# Patient Record
Sex: MALE | Race: WHITE | NOT HISPANIC OR LATINO | Employment: FULL TIME | URBAN - NONMETROPOLITAN AREA
[De-identification: names, ages, dates, MRNs, and addresses within clinical notes are randomized per-mention and may not be internally consistent; named-entity substitution may affect disease eponyms.]

---

## 2017-06-02 ENCOUNTER — OFFICE VISIT (OUTPATIENT)
Dept: INTERNAL MEDICINE | Facility: CLINIC | Age: 37
End: 2017-06-02

## 2017-06-02 VITALS
HEIGHT: 76 IN | OXYGEN SATURATION: 98 % | TEMPERATURE: 98.3 F | DIASTOLIC BLOOD PRESSURE: 72 MMHG | BODY MASS INDEX: 27.64 KG/M2 | SYSTOLIC BLOOD PRESSURE: 136 MMHG | WEIGHT: 227 LBS | HEART RATE: 97 BPM

## 2017-06-02 DIAGNOSIS — J45.21 MILD INTERMITTENT ASTHMA WITH ACUTE EXACERBATION: Primary | ICD-10-CM

## 2017-06-02 PROBLEM — J45.909 AIRWAY HYPERREACTIVITY: Status: ACTIVE | Noted: 2017-06-02

## 2017-06-02 PROCEDURE — 99213 OFFICE O/P EST LOW 20 MIN: CPT | Performed by: PHYSICIAN ASSISTANT

## 2017-06-02 RX ORDER — MONTELUKAST SODIUM 10 MG/1
10 TABLET ORAL NIGHTLY
Qty: 30 TABLET | Refills: 11 | Status: SHIPPED | OUTPATIENT
Start: 2017-06-02

## 2017-06-02 RX ORDER — DIPHENHYDRAMINE HCL 50 MG
50 CAPSULE ORAL EVERY 6 HOURS PRN
COMMUNITY

## 2017-06-02 RX ORDER — ALBUTEROL SULFATE 90 UG/1
2 AEROSOL, METERED RESPIRATORY (INHALATION) EVERY 4 HOURS PRN
Qty: 1 INHALER | Refills: 11 | Status: SHIPPED | OUTPATIENT
Start: 2017-06-02 | End: 2018-03-19 | Stop reason: SDUPTHER

## 2017-06-02 NOTE — PROGRESS NOTES
Vazquez Taveras is a 37 y.o. male.     Subjective   History of Present Illness    Here today requesting refill of albuterol inhaler for asthma. Has been using the albuterol inhaler daily for the last month to control intermittent dyspnea and wheezing which is typical for him this time of year. Other than during spring allergy season he rarely uses the inhaler. Is using Zyrtec daily and Benadryl nightly to help his allergies. Has never used Singulair for allergy and asthma control. Denies chest pain or tightness.       The following portions of the patient's history were reviewed and updated as appropriate: allergies, current medications, past family history, past medical history, past social history, past surgical history and problem list.    Review of Systems    Constitutional: Negative for appetite change, chills, fatigue, fever and unexpected weight change.   HENT: Negative for congestion, ear pain, hearing loss, nosebleeds, postnasal drip, rhinorrhea, sore throat, tinnitus and trouble swallowing.    Eyes: Negative for photophobia, discharge and visual disturbance.   Respiratory: dyspnea, wheezing. Negative for cough, chest tightness   Cardiovascular: Negative for chest pain, palpitations and leg swelling.   Gastrointestinal: Negative for abdominal distention, abdominal pain, blood in stool, constipation, diarrhea, nausea and vomiting.   Endocrine: Negative for cold intolerance, heat intolerance, polydipsia, polyphagia and polyuria.   Musculoskeletal: Negative for arthralgias, back pain, joint swelling, myalgias, neck pain and neck stiffness.   Skin: Negative for color change, pallor, rash and wound.   Allergic/Immunologic: Negative for environmental allergies, food allergies and immunocompromised state.   Neurological: Negative for dizziness, tremors, seizures, weakness, numbness and headaches.   Hematological: Negative for adenopathy. Does not bruise/bleed easily.   Psychiatric/Behavioral: Negative for sleep  "disturbances, agitation, behavioral problems, confusion, hallucinations, self-injury and suicidal ideas. The patient is not nervous/anxious.      Objective    Physical Exam  Constitutional: Oriented to person, place, and time. Appears well-developed and well-nourished.   HENT:   Head: Normocephalic and atraumatic.   Eyes: EOM are normal. Pupils are equal, round, and reactive to light.   Neck: Normal range of motion. Neck supple.   Cardiovascular: Normal rate, regular rhythm and normal heart sounds.    Pulmonary/Chest: occasional wheeze. Effort normal and breath sounds normal. No respiratory distress.  Has no rales. Exhibits no chest wall tenderness.   Abdominal: Soft. Bowel sounds are normal. Exhibits no distension and no mass. There is no tenderness.   Musculoskeletal: Normal range of motion. Exhibits no tenderness.   Neurological: Alert and oriented to person, place, and time.   Skin: Skin is warm and dry.   Psychiatric: Has a normal mood and affect. Behavior is normal. Judgment and thought content normal.       /72  Pulse 97  Temp 98.3 °F (36.8 °C)  Ht 76\" (193 cm)  Wt 227 lb (103 kg)  SpO2 98%  BMI 27.63 kg/m2    Nursing note and vitals reviewed.        Assessment/Plan   Vazquez was seen today for follow-up and asthma.    Diagnoses and all orders for this visit:    Mild intermittent asthma with acute exacerbation  -     albuterol (PROAIR HFA) 108 (90 BASE) MCG/ACT inhaler; Inhale 2 puffs Every 4 (Four) Hours As Needed for Wheezing.  -     montelukast (SINGULAIR) 10 MG tablet; Take 1 tablet by mouth Every Night.      Continue Zyrtec daily and Benadryl nightly prn.            "

## 2018-03-19 DIAGNOSIS — J45.21 MILD INTERMITTENT ASTHMA WITH ACUTE EXACERBATION: ICD-10-CM

## 2018-03-19 RX ORDER — ALBUTEROL SULFATE 90 UG/1
2 AEROSOL, METERED RESPIRATORY (INHALATION) EVERY 4 HOURS PRN
Qty: 3 INHALER | Refills: 1 | Status: SHIPPED | OUTPATIENT
Start: 2018-03-19

## 2025-03-07 ENCOUNTER — OFFICE VISIT (OUTPATIENT)
Age: 45
End: 2025-03-07
Payer: COMMERCIAL

## 2025-03-07 VITALS
TEMPERATURE: 98.6 F | DIASTOLIC BLOOD PRESSURE: 78 MMHG | HEART RATE: 75 BPM | OXYGEN SATURATION: 97 % | SYSTOLIC BLOOD PRESSURE: 128 MMHG | WEIGHT: 226 LBS | BODY MASS INDEX: 27.52 KG/M2 | HEIGHT: 76 IN

## 2025-03-07 DIAGNOSIS — Z00.00 ANNUAL PHYSICAL EXAM: Primary | ICD-10-CM

## 2025-03-07 DIAGNOSIS — J45.20 MILD INTERMITTENT ASTHMA WITHOUT COMPLICATION: ICD-10-CM

## 2025-03-07 DIAGNOSIS — Z23 NEED FOR TDAP VACCINATION: ICD-10-CM

## 2025-03-07 DIAGNOSIS — R41.840 ATTENTION OR CONCENTRATION DEFICIT: ICD-10-CM

## 2025-03-07 LAB
ALBUMIN SERPL-MCNC: 4.9 G/DL (ref 3.5–5.2)
ALBUMIN/GLOB SERPL: 1.9 G/DL
ALP SERPL-CCNC: 50 U/L (ref 39–117)
ALT SERPL W P-5'-P-CCNC: 17 U/L (ref 1–41)
ANION GAP SERPL CALCULATED.3IONS-SCNC: 11.2 MMOL/L (ref 5–15)
AST SERPL-CCNC: 25 U/L (ref 1–40)
BASOPHILS # BLD AUTO: 0.04 10*3/MM3 (ref 0–0.2)
BASOPHILS NFR BLD AUTO: 0.9 % (ref 0–1.5)
BILIRUB SERPL-MCNC: 0.6 MG/DL (ref 0–1.2)
BUN SERPL-MCNC: 8 MG/DL (ref 6–20)
BUN/CREAT SERPL: 9.4 (ref 7–25)
CALCIUM SPEC-SCNC: 10.3 MG/DL (ref 8.6–10.5)
CHLORIDE SERPL-SCNC: 100 MMOL/L (ref 98–107)
CHOLEST SERPL-MCNC: 207 MG/DL (ref 0–200)
CO2 SERPL-SCNC: 26.8 MMOL/L (ref 22–29)
CREAT SERPL-MCNC: 0.85 MG/DL (ref 0.76–1.27)
DEPRECATED RDW RBC AUTO: 42.2 FL (ref 37–54)
EGFRCR SERPLBLD CKD-EPI 2021: 109.9 ML/MIN/1.73
EOSINOPHIL # BLD AUTO: 0.17 10*3/MM3 (ref 0–0.4)
EOSINOPHIL NFR BLD AUTO: 3.8 % (ref 0.3–6.2)
ERYTHROCYTE [DISTWIDTH] IN BLOOD BY AUTOMATED COUNT: 12.1 % (ref 12.3–15.4)
GLOBULIN UR ELPH-MCNC: 2.6 GM/DL
GLUCOSE SERPL-MCNC: 89 MG/DL (ref 65–99)
HBA1C MFR BLD: 5.3 % (ref 4.8–5.6)
HCT VFR BLD AUTO: 45.3 % (ref 37.5–51)
HDLC SERPL-MCNC: 60 MG/DL (ref 40–60)
HGB BLD-MCNC: 14.9 G/DL (ref 13–17.7)
IMM GRANULOCYTES # BLD AUTO: 0.01 10*3/MM3 (ref 0–0.05)
IMM GRANULOCYTES NFR BLD AUTO: 0.2 % (ref 0–0.5)
LDLC SERPL CALC-MCNC: 130 MG/DL (ref 0–100)
LDLC/HDLC SERPL: 2.13 {RATIO}
LYMPHOCYTES # BLD AUTO: 1.36 10*3/MM3 (ref 0.7–3.1)
LYMPHOCYTES NFR BLD AUTO: 30.2 % (ref 19.6–45.3)
MCH RBC QN AUTO: 31 PG (ref 26.6–33)
MCHC RBC AUTO-ENTMCNC: 32.9 G/DL (ref 31.5–35.7)
MCV RBC AUTO: 94.4 FL (ref 79–97)
MONOCYTES # BLD AUTO: 0.31 10*3/MM3 (ref 0.1–0.9)
MONOCYTES NFR BLD AUTO: 6.9 % (ref 5–12)
NEUTROPHILS NFR BLD AUTO: 2.62 10*3/MM3 (ref 1.7–7)
NEUTROPHILS NFR BLD AUTO: 58 % (ref 42.7–76)
NRBC BLD AUTO-RTO: 0 /100 WBC (ref 0–0.2)
PLATELET # BLD AUTO: 255 10*3/MM3 (ref 140–450)
PMV BLD AUTO: 10.6 FL (ref 6–12)
POTASSIUM SERPL-SCNC: 4.4 MMOL/L (ref 3.5–5.2)
PROT SERPL-MCNC: 7.5 G/DL (ref 6–8.5)
RBC # BLD AUTO: 4.8 10*6/MM3 (ref 4.14–5.8)
SODIUM SERPL-SCNC: 138 MMOL/L (ref 136–145)
TRIGL SERPL-MCNC: 95 MG/DL (ref 0–150)
TSH SERPL DL<=0.05 MIU/L-ACNC: 1.91 UIU/ML (ref 0.27–4.2)
VLDLC SERPL-MCNC: 17 MG/DL (ref 5–40)
WBC NRBC COR # BLD AUTO: 4.51 10*3/MM3 (ref 3.4–10.8)

## 2025-03-07 PROCEDURE — 80050 GENERAL HEALTH PANEL: CPT | Performed by: STUDENT IN AN ORGANIZED HEALTH CARE EDUCATION/TRAINING PROGRAM

## 2025-03-07 PROCEDURE — 83036 HEMOGLOBIN GLYCOSYLATED A1C: CPT | Performed by: STUDENT IN AN ORGANIZED HEALTH CARE EDUCATION/TRAINING PROGRAM

## 2025-03-07 PROCEDURE — 80061 LIPID PANEL: CPT | Performed by: STUDENT IN AN ORGANIZED HEALTH CARE EDUCATION/TRAINING PROGRAM

## 2025-03-07 RX ORDER — ALBUTEROL SULFATE 90 UG/1
2 INHALANT RESPIRATORY (INHALATION) EVERY 4 HOURS PRN
Qty: 18 G | Refills: 3 | Status: SHIPPED | OUTPATIENT
Start: 2025-03-07

## 2025-03-07 NOTE — PATIENT INSTRUCTIONS

## 2025-03-07 NOTE — PROGRESS NOTES
New Patient Office Visit      Date: 2025  Patient Name: Vazquez Taveras  : 1980   MRN: [id number]     Chief Complaint:    Chief Complaint   Patient presents with    Establish Care     History of Present Illness  The patient is a 45-year-old male who presents to establish care.    He has not sought medical attention for the past 12 years and is currently seeking a routine checkup. He reports a significant weight loss of approximately 60 pounds over the past 3 years, which he attributes to cessation of bourbon consumption. His weight appears to have stabilized around 190 pounds.    He also requests a renewal of his asthma medication, specifically albuterol, as he has been utilizing over-the-counter alternatives since his last visit to a primary care physician. He has been using rescue inhaler once or twice a week. He has been managing his asthma, diagnosed at the age of 5, with intermittent use of daily inhalers. He discontinued Singulair due to perceived lack of efficacy and unusual side effects. He reports a recent decrease in the severity of his asthma and allergy symptoms.    He expresses concern about potential ADHD, although he has never been formally diagnosed or treated for this condition. His wife and mother have observed symptoms consistent with ADHD, and he notes similar behaviors in his 6-year-old daughter. He reports difficulty with sustained concentration, particularly in writing, and has previously resorted to self-medication with nicotine, alcohol, and marijuana. He also experiences challenges with higher-order thinking, prioritizing tasks, and increased irritability. He frequently misplaces items and struggles to maintain focus on a single task. He often initiates tasks but fails to complete them, instead returning to them later. His mother has retained his school reports, which consistently indicate poor time management. He expresses apprehension about the use of Wellbutrin.    SOCIAL  HISTORY  The patient admits to self-medicating with nicotine, alcohol, and marijuana in the past. He quit drinking bourbon, which he believes contributed to significant weight loss.    FAMILY HISTORY  The patient mentions that his 6-year-old daughter has symptoms similar to his own, suggesting a possible hereditary component to his condition.    MEDICATIONS  Current: Albuterol  Discontinued: Singulair    Subjective      Review of Systems:   Review of Systems   Constitutional:  Negative for chills and fever.   HENT:  Negative for congestion, sneezing and sore throat.    Respiratory:  Positive for shortness of breath. Negative for cough.    Cardiovascular:  Negative for chest pain.   Gastrointestinal:  Negative for nausea.   Genitourinary:  Negative for dysuria.   Skin:  Negative for rash.   Psychiatric/Behavioral:  Positive for decreased concentration. The patient is not nervous/anxious.        Past Medical History:   Past Medical History:   Diagnosis Date    ADHD (attention deficit hyperactivity disorder)     Allergic     Asthma     Headache        Past Surgical History: No past surgical history on file.    Family History:   Family History   Problem Relation Age of Onset    Cancer Mother        Social History:   Social History     Socioeconomic History    Marital status:    Tobacco Use    Smoking status: Never    Smokeless tobacco: Never   Vaping Use    Vaping status: Never Used   Substance and Sexual Activity    Alcohol use: Yes     Alcohol/week: 12.0 standard drinks of alcohol     Types: 12 Cans of beer per week    Drug use: Never    Sexual activity: Yes     Partners: Female     Birth control/protection: Condom       Medications:     Current Outpatient Medications:     albuterol sulfate HFA (ProAir HFA) 108 (90 Base) MCG/ACT inhaler, Inhale 2 puffs Every 4 (Four) Hours As Needed for Wheezing., Disp: 18 g, Rfl: 3    diphenhydrAMINE (BENADRYL) 50 MG capsule, Take 1 capsule by mouth Every 6 (Six) Hours As  "Needed., Disp: , Rfl:     Allergies:   Allergies   Allergen Reactions    No Known Drug Allergy        Objective     Physical Exam:  Vital Signs:   Vitals:    03/07/25 0854   BP: 128/78   Pulse: 75   Temp: 98.6 °F (37 °C)   SpO2: 97%   Weight: 103 kg (226 lb)   Height: 193 cm (76\")     Body mass index is 27.51 kg/m².   Facility age limit for growth %valentin is 20 years.  BMI is >= 25 and <30. (Overweight) The following options were offered after discussion;: BMI only slightly elevated at 27.51. Patient has had significant weight loss over the past several years.        Physical Exam  Vitals and nursing note reviewed.   Constitutional:       Appearance: Normal appearance.   HENT:      Head: Atraumatic.   Eyes:      Pupils: Pupils are equal, round, and reactive to light.   Cardiovascular:      Rate and Rhythm: Normal rate and regular rhythm.      Heart sounds: No murmur heard.  Pulmonary:      Effort: Pulmonary effort is normal.      Breath sounds: Normal breath sounds.   Musculoskeletal:         General: Normal range of motion.      Cervical back: Normal range of motion.      Right lower leg: No edema.      Left lower leg: No edema.   Skin:     General: Skin is warm and dry.   Neurological:      General: No focal deficit present.      Mental Status: He is alert and oriented to person, place, and time.      Gait: Gait is intact.   Psychiatric:         Mood and Affect: Mood normal.         Behavior: Behavior normal.       Physical Exam  Lungs are clear. No wheezes.  Heart was examined.    POCT Results (if applicable):   No results found for this or any previous visit.    Procedures    Measures:   Smoking Cessation:   Never smoker.      Assessment / Plan      Assessment/Plan:   Diagnoses and all orders for this visit:    1. Annual physical exam (Primary)  -     CBC w AUTO Differential; Future  -     Comprehensive metabolic panel; Future  -     TSH; Future  -     Lipid panel; Future  -     Hemoglobin A1c; Future  -     CBC w " AUTO Differential  -     Comprehensive metabolic panel  -     TSH  -     Lipid panel  -     Hemoglobin A1c    2. Mild intermittent asthma without complication  -     albuterol sulfate HFA (ProAir HFA) 108 (90 Base) MCG/ACT inhaler; Inhale 2 puffs Every 4 (Four) Hours As Needed for Wheezing.  Dispense: 18 g; Refill: 3    3. Attention or concentration deficit  -     Ambulatory Referral to Behavioral Health    4. Need for Tdap vaccination  -     Tdap Vaccine Greater Than or Equal To 6yo IM       Assessment & Plan  1. Asthma.  He uses his rescue inhaler once or twice a week and has not been consistent with daily inhaler use. He has discontinued Singulair (montelukast) due to side effects and lack of perceived benefit. A prescription for albuterol will be renewed and sent to [facility].    2. Suspected attention deficit hyperactivity disorder.  He reports symptoms consistent with ADHD, including difficulty with sustained concentration, higher-order thinking, and increased irritability. He has not been formally diagnosed or medicated for ADHD before. A referral to behavioral health will be initiated for a formal diagnosis and potential treatment initiation.    3. Weight loss.  He has experienced significant weight loss over the past 3 years, likely due to cessation of bourbon consumption. Annual labs will be conducted today to screen for any underlying conditions contributing to the weight loss. The lab work will include a complete blood count, metabolic panel, thyroid function tests, lipid panel, and A1c.    4. Health maintenance.  He will receive a Tdap vaccine today, as he has not had one in the past 10 years.       Vaccine Counseling:  “Discussed risks/benefits to vaccination, reviewed components of the vaccine, discussed VIS, discussed informed consent, informed consent obtained. Patient/Parent was allowed to accept or refuse vaccine. Questions answered to satisfactory state of patient/Parent. We reviewed typical age  appropriate and seasonally appropriate vaccinations. Reviewed immunization history and updated state vaccination form as needed. Patient was counseled on Tdap    Follow Up:   Return in about 1 year (around 3/7/2026) for Annual physical.    Patient or patient representative verbalized consent for the use of Ambient Listening during the visit with  Lillian Peters MD for chart documentation. 3/7/2025  17:22 EST      Lillian Peters MD  [facility]

## 2025-03-10 ENCOUNTER — PATIENT ROUNDING (BHMG ONLY) (OUTPATIENT)
Age: 45
End: 2025-03-10
Payer: COMMERCIAL

## 2025-03-10 NOTE — PROGRESS NOTES
A [employer] message has been sent to the patient for PATIENT ROUNDING with [facility].

## 2025-04-18 ENCOUNTER — OFFICE VISIT (OUTPATIENT)
Dept: PSYCHIATRY | Facility: CLINIC | Age: 45
End: 2025-04-18
Payer: COMMERCIAL

## 2025-04-18 VITALS
WEIGHT: 205.6 LBS | OXYGEN SATURATION: 99 % | SYSTOLIC BLOOD PRESSURE: 124 MMHG | DIASTOLIC BLOOD PRESSURE: 78 MMHG | HEIGHT: 76 IN | HEART RATE: 72 BPM | BODY MASS INDEX: 25.04 KG/M2

## 2025-04-18 DIAGNOSIS — Z13.39 ADHD (ATTENTION DEFICIT HYPERACTIVITY DISORDER) EVALUATION: ICD-10-CM

## 2025-04-18 DIAGNOSIS — F41.1 GENERALIZED ANXIETY DISORDER: Primary | ICD-10-CM

## 2025-04-18 DIAGNOSIS — F32.A MILD DEPRESSION: ICD-10-CM

## 2025-04-18 DIAGNOSIS — R45.4 IRRITABILITY: ICD-10-CM

## 2025-04-18 PROCEDURE — 90791 PSYCH DIAGNOSTIC EVALUATION: CPT | Performed by: REGISTERED NURSE
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
## 2025-05-15 ENCOUNTER — PATIENT MESSAGE (OUTPATIENT)
Age: 45
End: 2025-05-15
Payer: COMMERCIAL

## 2025-05-15 DIAGNOSIS — J45.20 MILD INTERMITTENT ASTHMA WITHOUT COMPLICATION: ICD-10-CM

## 2025-05-15 RX ORDER — ALBUTEROL SULFATE 90 UG/1
2 INHALANT RESPIRATORY (INHALATION) EVERY 4 HOURS PRN
Qty: 18 G | Refills: 5 | Status: SHIPPED | OUTPATIENT
Start: 2025-05-15

## 2025-05-20 ENCOUNTER — OFFICE VISIT (OUTPATIENT)
Dept: PSYCHIATRY | Facility: CLINIC | Age: 45
End: 2025-05-20
Payer: COMMERCIAL

## 2025-05-20 VITALS
HEART RATE: 72 BPM | BODY MASS INDEX: 24.74 KG/M2 | SYSTOLIC BLOOD PRESSURE: 128 MMHG | OXYGEN SATURATION: 99 % | HEIGHT: 76 IN | WEIGHT: 203.2 LBS | DIASTOLIC BLOOD PRESSURE: 74 MMHG

## 2025-05-20 DIAGNOSIS — F41.1 GENERALIZED ANXIETY DISORDER: Primary | ICD-10-CM

## 2025-05-20 DIAGNOSIS — R45.4 IRRITABILITY: ICD-10-CM

## 2025-05-20 NOTE — PROGRESS NOTES
Follow Up Office Visit      Patient Name: Vazquez Taveras  : 1980   MRN: [id number]     Referring Provider: Lillian Peters MD    Chief Complaint:      ICD-10-CM ICD-9-CM   1. Generalized anxiety disorder  F41.1 300.02   2. Irritability  R45.4 799.22        History of Present Illness  Vazquez Taveras is a 45 y.o. male who is here today for follow up with medication and symptom management.    He reports a current state of well-being, with his focus primarily on a research question that has been occupying his thoughts both during wakefulness and sleep. He describes a hyper fixation on this topic over the past 3 days. He expresses a preference against stimulant-based treatments and has concerns about potential side effects of medications. He acknowledges that his anxiety may be influencing his daily mood fluctuations. He also reports sleep disturbances. He does not experience panic attacks that interfere with his daily activities but admits to mood swings. 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 He identifies time management as a challenge and questions the efficacy of medication in addressing this issue. He does not endorse any thoughts of self-harm.    We reviewed patient's CPT 3 test results on today's visit that show the patient did not have a disorder characterized by ADHD or ADHD like symptoms.  Patient does exhibit some ADHD symptoms such as inattentiveness, procrastination, irritability, feeling overwhelmed, unable to concentrate and focus on most things but then hyper fixates on other things, and difficulty sleeping at times.  However, after discussing the symptoms with patient, patient felt at this time it was best to not go down the medication management route and wanted to discuss this with his wife and consider all options.   He states he has done a lot of research on different medications for his symptoms and is not sure that this is something he wants to do at this time.  We discussed this at length on today's visit.  He will notify this provider if he feels medication management is necessary.  Patient is open to be referred to psychotherapy and he will be referred to therapy on today's visit which could be very beneficial for patient's treatment plan and progress.  He denies any recent or current SI/HI/AVH.  He was instructed with any new or worsening symptoms to notify this provider.  He was instructed with any thoughts of self-harm or suicidal ideation with intent or plan to go directly to the emergency room.  He was instructed on adequate nutrition and hydration as well as adapting to a good exercise regimen to help improve his overall mental physical wellbeing.  He verbalized understanding to all.  He will follow up with this provider as needed.    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 The patient is nervous/anxious.        Screening Scores:   PHQ-9 Total Score: 3    JACKIE-7  Feeling nervous, anxious or on edge: More than half the days  Not being able to stop or control worrying: More than half the days  Worrying too much about different things: More than half the days  Trouble Relaxing: More than half the days  Being so restless that it is hard to sit still: Several days  Feeling afraid as if something awful might happen: Several  "days  Becoming easily annoyed or irritable: Several days  JACKIE 7 Total Score: 11  If you checked any problems, how difficult have these problems made it for you to do your work, take care of things at home, or get along with other people: Somewhat difficult    RISK ASSESSMENT:  Patient denies any high risk factors today.    Medications:     Current Outpatient Medications:     albuterol sulfate HFA (ProAir HFA) 108 (90 Base) MCG/ACT inhaler, Inhale 2 puffs Every 4 (Four) Hours As Needed for Wheezing., Disp: 18 g, Rfl: 5    diphenhydrAMINE (BENADRYL) 50 MG capsule, Take 1 capsule by mouth Every 6 (Six) Hours As Needed., Disp: , Rfl:     Medication Considerations:  LIS reviewed and appropriate.      Allergies:   No Known Allergies      Objective     Physical Exam:  Vital Signs:   Vitals:    05/20/25 0805   BP: 128/74   Pulse: 72   SpO2: 99%   Weight: 92.2 kg (203 lb 3.2 oz)   Height: 193 cm (76\")     Body mass index is 24.73 kg/m².     Mental Status Exam:   MENTAL STATUS EXAM   General Appearance:  Cleanly groomed and dressed  Eye Contact:  Good eye contact  Attitude:  Cooperative  Motor Activity:  Fidgety  Muscle Strength:  Normal  Speech:  Hyper talkative  Language:  Spontaneous  Mood and affect:  Normal, pleasant  Hopelessness:  Denies  Loneliness: Denies  Thought Process:  Logical  Associations/ Thought Content:  No delusions  Hallucinations:  None  Suicidal Ideations:  Not present  Homicidal Ideation:  Not present  Sensorium:  Alert  Orientation:  Person, place, time and situation  Immediate Recall, Recent, and Remote Memory:  Intact  Attention Span/ Concentration:  Easily distracted  Fund of Knowledge:  Appropriate for age and educational level  Intellectual Functioning:  Average range  Insight:  Good  Judgement:  Good  Reliability:  Good  Impulse Control:  Good         Assessment / Plan      Visit Diagnosis/Orders Placed This Visit:  Diagnoses and all orders for this visit:    1. Generalized anxiety disorder " (Primary)    2. Irritability         Functional Status: Mild impairment     Prognosis: Good with Ongoing Treatment     Impression/Formulation:  Patient appeared alert and oriented. Patient is receptive to assistance with maintaining a stable lifestyle.  Patient presents with history of     ICD-10-CM ICD-9-CM   1. Generalized anxiety disorder  F41.1 300.02   2. Irritability  R45.4 799.22   .     Treatment Plan:   -Reviewed patient CPT 3 test results on today's visit  -Refer to psychotherapy  -Follow-up with this provider as needed      The LIS report, reviewed through PDMP, of the past 12 months were reviewed and is appropriate.  The patient/guardian reports taking the medication only as prescribed.  The patient/guardian denies any abuse or misuse of the medication.  The patient/guardian denies any other substance use or issues.  There are no apparent substance related issues.  The patient reports no side effects of the current medication usage.  The patient/guardian has reported significant improvement with medication usage and wishes to continue medication as prescribed.  The patient/guardian is appropriate to continue with current medication usage at this time.  Reinforced risks and side effects of medication usage, patient and/or guardian verbalize understanding in their own words and are in agreement with current plan.    GOALS:  Short Term Goals: Patient will be compliant with medication, and patient will have no significant medication related side effects.  Patient will be engaged in psychotherapy as indicated.  Patient will report subjective improvement of symptoms.  Long term goals: To stabilize mood and treat/improve subjective symptoms, the patient will stay out of the hospital, the patient will be at an optimal level of functioning, and the patient will take all medications as prescribed.  The patient/guardian verbalized understanding and agreement with goals that were mutually set.     Patient will  continue supportive psychotherapy efforts and medications as indicated. Clinic will obtain release of information for current treatment team for continuity of care as needed. Patient will contact this office, call 911 or present to the nearest emergency room should suicidal or homicidal ideations occur.  Discussed medication options and treatment plan of prescribed medication(s) as well as the risks, benefits, and potential side effects. Patient ackowledged and verbally consented to continue with current treatment plan and was educated on the importance of compliance with treatment and follow-up appointments.     Follow Up:   Return if symptoms worsen or fail to improve.      ANISHA Farias  [facility]     This is electronically signed by ANISHA Farias   05/20/2025 08:53 EDT    Patient or patient representative verbalized consent for the use of Ambient Listening during the visit with  ANISHA Beasley for chart documentation. 5/20/2025  08:52 EDT    Part of this note may be an electronic transcription/translation of spoken language to printed text using the Dragon Dictation System.

## 2025-07-23 ENCOUNTER — OFFICE VISIT (OUTPATIENT)
Dept: PSYCHIATRY | Facility: CLINIC | Age: 45
End: 2025-07-23
Payer: COMMERCIAL

## 2025-07-23 DIAGNOSIS — F41.1 GENERALIZED ANXIETY DISORDER: Primary | ICD-10-CM

## 2025-07-23 DIAGNOSIS — F32.A MILD DEPRESSION: ICD-10-CM
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
## 2025-07-24 ENCOUNTER — OFFICE VISIT (OUTPATIENT)
Age: 45
End: 2025-07-24
Payer: COMMERCIAL

## 2025-07-24 VITALS
WEIGHT: 194 LBS | HEIGHT: 76 IN | BODY MASS INDEX: 23.62 KG/M2 | TEMPERATURE: 98.8 F | SYSTOLIC BLOOD PRESSURE: 127 MMHG | OXYGEN SATURATION: 98 % | DIASTOLIC BLOOD PRESSURE: 76 MMHG | HEART RATE: 84 BPM

## 2025-07-24 DIAGNOSIS — F41.9 ANXIETY: ICD-10-CM

## 2025-07-24 DIAGNOSIS — T14.8XXA SKIN ABRASION: ICD-10-CM

## 2025-07-24 DIAGNOSIS — M25.50 POLYARTHRALGIA: Primary | ICD-10-CM

## 2025-07-24 PROCEDURE — 87798 DETECT AGENT NOS DNA AMP: CPT | Performed by: STUDENT IN AN ORGANIZED HEALTH CARE EDUCATION/TRAINING PROGRAM

## 2025-07-24 PROCEDURE — 86618 LYME DISEASE ANTIBODY: CPT | Performed by: STUDENT IN AN ORGANIZED HEALTH CARE EDUCATION/TRAINING PROGRAM

## 2025-07-24 NOTE — PROGRESS NOTES
Follow Up Office Visit      Date: 2025   Patient Name: Vazquez Taveras  : 1980   MRN: [id number]     Chief Complaint:    Chief Complaint   Patient presents with    Rash     Check for lyme disease and check for hand foot and mouth     History of Present Illness  The patient is a 45-year-old male who presents for an acute visit.    He reports experiencing intermittent shoulder pain, which he describes as arthritic in nature. This pain occasionally extends to the area between his shoulder blades. The discomfort is not work-related but occurs during activities such as driving. He also mentions occasional ankle issues, similar to the shoulder pain. He has no known tick bites but recalls removing ticks from his body during the summer months. He does not remember the last time he had a deeply embedded tick. He reports no rashes, including bull's-eye-shaped ones.    He has a rash on his foot that he would like to have examined. He has been working on a patio and wearing Crocs, which he believes may have caused the rash due to sand exposure. The rash was raised this morning but not weepy. He has not been in contact with children recently.    During his last visit, ADHD medications were discussed. He consulted with behavioral health professionals and decided against medication. He was diagnosed with irritability and anxiety and is self-medicating with THC. He is hesitant to discontinue this treatment. He has an aversion to Wellbutrin due to concerns about daily pill intake. He was recommended therapy and saw a therapist, but they primarily discussed anxiety rather than ADHD. He was advised to practice yoga and meditation. He was also diagnosed with mild depression. He notes that THC helps his anxiety and irritability, but he can stop using it for a few weeks before his wife notices a difference. He mentions that THC gummies do not work for him due to liver metabolism issues.    Recreational Drugs: The patient  "uses THC.    Subjective      Review of Systems:   Review of Systems   Constitutional:  Negative for chills and fever.   HENT:  Negative for congestion, sneezing and sore throat.    Respiratory:  Negative for cough and shortness of breath.    Cardiovascular:  Negative for chest pain.   Gastrointestinal:  Negative for nausea.   Genitourinary:  Negative for dysuria.   Musculoskeletal:  Positive for arthralgias.   Skin:  Positive for rash.   Psychiatric/Behavioral:  The patient is nervous/anxious.         I have reviewed the patients family history, social history, past medical history, past surgical history and have updated it as appropriate.     Medications:     Current Outpatient Medications:     albuterol sulfate HFA (ProAir HFA) 108 (90 Base) MCG/ACT inhaler, Inhale 2 puffs Every 4 (Four) Hours As Needed for Wheezing., Disp: 18 g, Rfl: 5    diphenhydrAMINE (BENADRYL) 50 MG capsule, Take 1 capsule by mouth Every 6 (Six) Hours As Needed., Disp: , Rfl:     Allergies:   No Known Allergies    Objective     Physical Exam: Please see above  Vital Signs:   Vitals:    07/24/25 1248   BP: 127/76   Pulse: 84   Temp: 98.8 °F (37.1 °C)   SpO2: 98%   Weight: 88 kg (194 lb)   Height: 193 cm (76\")     Body mass index is 23.61 kg/m².  BMI is within normal parameters. No other follow-up for BMI required.       Physical Exam  Vitals and nursing note reviewed.   Constitutional:       Appearance: Normal appearance.   HENT:      Head: Atraumatic.   Eyes:      Pupils: Pupils are equal, round, and reactive to light.   Cardiovascular:      Rate and Rhythm: Normal rate and regular rhythm.      Heart sounds: No murmur heard.  Pulmonary:      Effort: Pulmonary effort is normal.      Breath sounds: Normal breath sounds.   Musculoskeletal:         General: Normal range of motion.      Cervical back: Normal range of motion.      Right lower leg: No edema.      Left lower leg: No edema.   Skin:     General: Skin is warm and dry.   Neurological: "      General: No focal deficit present.      Mental Status: He is alert and oriented to person, place, and time.      Gait: Gait is intact.   Psychiatric:         Mood and Affect: Mood normal.         Behavior: Behavior normal.       Physical Exam  Extremities: No tenderness or issues with shoulder movement. Foot abrasion noted, likely due to friction from sand in footwear.  Skin: Abrasion on foot, no signs of infection or hand, foot, and mouth disease.    Procedures    Results:   Labs:   Hemoglobin A1C   Date Value Ref Range Status   03/07/2025 5.30 4.80 - 5.60 % Final     TSH   Date Value Ref Range Status   03/07/2025 1.910 0.270 - 4.200 uIU/mL Final        POCT Results (if applicable):   Results for orders placed or performed in visit on 03/07/25   Comprehensive metabolic panel    Collection Time: 03/07/25  9:30 AM    Specimen: Arm, Left; Blood   Result Value Ref Range    Glucose 89 65 - 99 mg/dL    BUN 8 6 - 20 mg/dL    Creatinine 0.85 0.76 - 1.27 mg/dL    Sodium 138 136 - 145 mmol/L    Potassium 4.4 3.5 - 5.2 mmol/L    Chloride 100 98 - 107 mmol/L    CO2 26.8 22.0 - 29.0 mmol/L    Calcium 10.3 8.6 - 10.5 mg/dL    Total Protein 7.5 6.0 - 8.5 g/dL    Albumin 4.9 3.5 - 5.2 g/dL    ALT (SGPT) 17 1 - 41 U/L    AST (SGOT) 25 1 - 40 U/L    Alkaline Phosphatase 50 39 - 117 U/L    Total Bilirubin 0.6 0.0 - 1.2 mg/dL    Globulin 2.6 gm/dL    A/G Ratio 1.9 g/dL    BUN/Creatinine Ratio 9.4 7.0 - 25.0    Anion Gap 11.2 5.0 - 15.0 mmol/L    eGFR 109.9 >60.0 mL/min/1.73   TSH    Collection Time: 03/07/25  9:30 AM    Specimen: Arm, Left; Blood   Result Value Ref Range    TSH 1.910 0.270 - 4.200 uIU/mL   Lipid panel    Collection Time: 03/07/25  9:30 AM    Specimen: Arm, Left; Blood   Result Value Ref Range    Total Cholesterol 207 (H) 0 - 200 mg/dL    Triglycerides 95 0 - 150 mg/dL    HDL Cholesterol 60 40 - 60 mg/dL    LDL Cholesterol  130 (H) 0 - 100 mg/dL    VLDL Cholesterol 17 5 - 40 mg/dL    LDL/HDL Ratio 2.13     Hemoglobin A1c    Collection Time: 03/07/25  9:30 AM    Specimen: Arm, Left; Blood   Result Value Ref Range    Hemoglobin A1C 5.30 4.80 - 5.60 %   CBC Auto Differential    Collection Time: 03/07/25  9:30 AM    Specimen: Arm, Left; Blood   Result Value Ref Range    WBC 4.51 3.40 - 10.80 10*3/mm3    RBC 4.80 4.14 - 5.80 10*6/mm3    Hemoglobin 14.9 13.0 - 17.7 g/dL    Hematocrit 45.3 37.5 - 51.0 %    MCV 94.4 79.0 - 97.0 fL    MCH 31.0 26.6 - 33.0 pg    MCHC 32.9 31.5 - 35.7 g/dL    RDW 12.1 (L) 12.3 - 15.4 %    RDW-SD 42.2 37.0 - 54.0 fl    MPV 10.6 6.0 - 12.0 fL    Platelets 255 140 - 450 10*3/mm3    Neutrophil % 58.0 42.7 - 76.0 %    Lymphocyte % 30.2 19.6 - 45.3 %    Monocyte % 6.9 5.0 - 12.0 %    Eosinophil % 3.8 0.3 - 6.2 %    Basophil % 0.9 0.0 - 1.5 %    Immature Grans % 0.2 0.0 - 0.5 %    Neutrophils, Absolute 2.62 1.70 - 7.00 10*3/mm3    Lymphocytes, Absolute 1.36 0.70 - 3.10 10*3/mm3    Monocytes, Absolute 0.31 0.10 - 0.90 10*3/mm3    Eosinophils, Absolute 0.17 0.00 - 0.40 10*3/mm3    Basophils, Absolute 0.04 0.00 - 0.20 10*3/mm3    Immature Grans, Absolute 0.01 0.00 - 0.05 10*3/mm3    nRBC 0.0 0.0 - 0.2 /100 WBC       Imaging:   No valid procedures specified.       Assessment / Plan      Assessment/Plan:   Diagnoses and all orders for this visit:    1. Polyarthralgia (Primary)  -     Rickettsia Species DNA, Real-Time PCR; Future  -     Lyme Disease Total Antibody With Reflex to Immunoassay; Future  -     Rickettsia Species DNA, Real-Time PCR  -     Lyme Disease Total Antibody With Reflex to Immunoassay    2. Skin abrasion    3. Anxiety      Assessment & Plan  1. Arthralgia:  - Reports intermittent shoulder pain and occasional ankle issues, possibly related to Lyme disease due to daughter's recent diagnosis.  - No known tick bites or bull's-eye rashes however has removed ticks  - Laboratory tests for Lyme disease and [place] spotted fever will be ordered today.  - Shoulder examination reveals no  tenderness or issues with range of motion.    2. Foot abrasion:  - Likely caused by sand in footwear while working on a patio.  - Not consistent with hand, foot, and mouth disease.  - Advised to cover abrasion with a bandage when wearing Crocs or socks and to allow it to air out for healing.  - Neosporin not necessary at this time; further evaluation needed if condition worsens or spreads.    3. Anxiety and ADHD:  - Diagnosed with anxiety and ADHD but prefers not to take medication at this time.  - Currently self-medicating with THC for anxiety and irritability.  - Potential use of Strattera discussed as an alternative if he decides to pursue medication in the future.  - Advised to continue with therapy and non-pharmacological interventions like yoga and meditation.      Follow Up:   Return if symptoms worsen or fail to improve..    Patient or patient representative verbalized consent for the use of Ambient Listening during the visit with  Lillian Peters MD for chart documentation. 7/25/2025  13:07 EDT      Lillian Peters MD  [facility]

## 2025-07-26 LAB — B BURGDOR IGG+IGM SER QL IA: NEGATIVE

## 2025-07-30 LAB — RICKETTSIA RICKETTSII DNA, RT: NOT DETECTED

## 2025-08-27 DIAGNOSIS — R41.840 ATTENTION OR CONCENTRATION DEFICIT: Primary | ICD-10-CM

## 2025-08-27 RX ORDER — ATOMOXETINE 40 MG/1
40 CAPSULE ORAL DAILY
Qty: 30 CAPSULE | Refills: 1 | Status: SHIPPED | OUTPATIENT
Start: 2025-08-27